# Patient Record
Sex: FEMALE | Race: WHITE | NOT HISPANIC OR LATINO
[De-identification: names, ages, dates, MRNs, and addresses within clinical notes are randomized per-mention and may not be internally consistent; named-entity substitution may affect disease eponyms.]

---
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
## 2024-02-16 RX ORDER — CIPROFLOXACIN LACTATE 400MG/40ML
1 VIAL (ML) INTRAVENOUS
Refills: 0 | DISCHARGE
Start: 2024-02-16

## 2024-02-22 ENCOUNTER — INPATIENT (INPATIENT)
Facility: HOSPITAL | Age: 19
LOS: 1 days | Discharge: HOME | DRG: 690 | End: 2024-02-24
Attending: FAMILY MEDICINE | Admitting: INTERNAL MEDICINE
Payer: COMMERCIAL

## 2024-02-22 VITALS — HEIGHT: 65 IN | WEIGHT: 138.01 LBS

## 2024-02-22 LAB
APPEARANCE UR: ABNORMAL
BILIRUB UR-MCNC: NEGATIVE — SIGNIFICANT CHANGE UP
COLOR SPEC: YELLOW — SIGNIFICANT CHANGE UP
DIFF PNL FLD: NEGATIVE — SIGNIFICANT CHANGE UP
GLUCOSE UR QL: NEGATIVE MG/DL — SIGNIFICANT CHANGE UP
HCT VFR BLD CALC: 40.6 % — SIGNIFICANT CHANGE UP (ref 34.5–45)
HGB BLD-MCNC: 13.7 G/DL — SIGNIFICANT CHANGE UP (ref 11.5–15.5)
KETONES UR-MCNC: NEGATIVE MG/DL — SIGNIFICANT CHANGE UP
LEUKOCYTE ESTERASE UR-ACNC: ABNORMAL
MCHC RBC-ENTMCNC: 28.7 PG — SIGNIFICANT CHANGE UP (ref 27–34)
MCHC RBC-ENTMCNC: 33.7 GM/DL — SIGNIFICANT CHANGE UP (ref 32–36)
MCV RBC AUTO: 85.1 FL — SIGNIFICANT CHANGE UP (ref 80–100)
NITRITE UR-MCNC: NEGATIVE — SIGNIFICANT CHANGE UP
PH UR: 5 — SIGNIFICANT CHANGE UP (ref 5–8)
PLATELET # BLD AUTO: 321 K/UL — SIGNIFICANT CHANGE UP (ref 150–400)
PROT UR-MCNC: NEGATIVE MG/DL — SIGNIFICANT CHANGE UP
RBC # BLD: 4.77 M/UL — SIGNIFICANT CHANGE UP (ref 3.8–5.2)
RBC # FLD: 13.1 % — SIGNIFICANT CHANGE UP (ref 10.3–14.5)
SP GR SPEC: 1.02 — SIGNIFICANT CHANGE UP (ref 1–1.03)
UROBILINOGEN FLD QL: 1 MG/DL — SIGNIFICANT CHANGE UP (ref 0.2–1)
WBC # BLD: 3.56 K/UL — LOW (ref 3.8–10.5)
WBC # FLD AUTO: 3.56 K/UL — LOW (ref 3.8–10.5)

## 2024-02-22 PROCEDURE — 99285 EMERGENCY DEPT VISIT HI MDM: CPT

## 2024-02-22 PROCEDURE — 93010 ELECTROCARDIOGRAM REPORT: CPT

## 2024-02-22 RX ORDER — SODIUM CHLORIDE 9 MG/ML
1950 INJECTION INTRAMUSCULAR; INTRAVENOUS; SUBCUTANEOUS ONCE
Refills: 0 | Status: COMPLETED | OUTPATIENT
Start: 2024-02-22 | End: 2024-02-22

## 2024-02-22 NOTE — ED ADULT TRIAGE NOTE - CHIEF COMPLAINT QUOTE
Pt presents to ED complaining of R sided flank pain. Was seen in ED x2 days ago and was diagnosed with Flu A and complicated UTI. Currently on Cipro. Wants to get urine retested, unable to tolerate ABX. Denies urinary symptoms.  Follows up with urologist.

## 2024-02-23 DIAGNOSIS — N12 TUBULO-INTERSTITIAL NEPHRITIS, NOT SPECIFIED AS ACUTE OR CHRONIC: ICD-10-CM

## 2024-02-23 LAB
ALBUMIN SERPL ELPH-MCNC: 4 G/DL — SIGNIFICANT CHANGE UP (ref 3.3–5)
ALP SERPL-CCNC: 55 U/L — SIGNIFICANT CHANGE UP (ref 40–120)
ALT FLD-CCNC: 21 U/L — SIGNIFICANT CHANGE UP (ref 12–78)
ANION GAP SERPL CALC-SCNC: 1 MMOL/L — LOW (ref 5–17)
APTT BLD: 32.3 SEC — SIGNIFICANT CHANGE UP (ref 24.5–35.6)
AST SERPL-CCNC: 20 U/L — SIGNIFICANT CHANGE UP (ref 15–37)
BACTERIA # UR AUTO: ABNORMAL /HPF
BASOPHILS # BLD AUTO: 0 K/UL — SIGNIFICANT CHANGE UP (ref 0–0.2)
BASOPHILS NFR BLD AUTO: 0 % — SIGNIFICANT CHANGE UP (ref 0–2)
BILIRUB SERPL-MCNC: 0.2 MG/DL — SIGNIFICANT CHANGE UP (ref 0.2–1.2)
BUN SERPL-MCNC: 8 MG/DL — SIGNIFICANT CHANGE UP (ref 7–23)
CALCIUM SERPL-MCNC: 9.1 MG/DL — SIGNIFICANT CHANGE UP (ref 8.5–10.1)
CAST: SIGNIFICANT CHANGE UP /LPF
CHLORIDE SERPL-SCNC: 109 MMOL/L — HIGH (ref 96–108)
CO2 SERPL-SCNC: 30 MMOL/L — SIGNIFICANT CHANGE UP (ref 22–31)
CREAT SERPL-MCNC: 0.88 MG/DL — SIGNIFICANT CHANGE UP (ref 0.5–1.3)
EGFR: 97 ML/MIN/1.73M2 — SIGNIFICANT CHANGE UP
EOSINOPHIL # BLD AUTO: 0.18 K/UL — SIGNIFICANT CHANGE UP (ref 0–0.5)
EOSINOPHIL NFR BLD AUTO: 5 % — SIGNIFICANT CHANGE UP (ref 0–6)
FLUAV AG NPH QL: DETECTED
FLUBV AG NPH QL: SIGNIFICANT CHANGE UP
GLUCOSE SERPL-MCNC: 97 MG/DL — SIGNIFICANT CHANGE UP (ref 70–99)
INR BLD: 0.95 RATIO — SIGNIFICANT CHANGE UP (ref 0.85–1.18)
LACTATE SERPL-SCNC: 0.7 MMOL/L — SIGNIFICANT CHANGE UP (ref 0.7–2)
LYMPHOCYTES # BLD AUTO: 1.35 K/UL — SIGNIFICANT CHANGE UP (ref 1–3.3)
LYMPHOCYTES # BLD AUTO: 38 % — SIGNIFICANT CHANGE UP (ref 13–44)
MANUAL SMEAR VERIFICATION: SIGNIFICANT CHANGE UP
MONOCYTES # BLD AUTO: 0.32 K/UL — SIGNIFICANT CHANGE UP (ref 0–0.9)
MONOCYTES NFR BLD AUTO: 9 % — SIGNIFICANT CHANGE UP (ref 2–14)
NEUTROPHILS # BLD AUTO: 1.64 K/UL — LOW (ref 1.8–7.4)
NEUTROPHILS NFR BLD AUTO: 45 % — SIGNIFICANT CHANGE UP (ref 43–77)
NEUTS BAND # BLD: 1 % — SIGNIFICANT CHANGE UP (ref 0–8)
NRBC # BLD: 0 /100 WBCS — SIGNIFICANT CHANGE UP (ref 0–0)
NRBC # BLD: SIGNIFICANT CHANGE UP /100 WBCS (ref 0–0)
PLAT MORPH BLD: NORMAL — SIGNIFICANT CHANGE UP
POCT URINE PREGNANCY TEST: NEGATIVE — SIGNIFICANT CHANGE UP
POTASSIUM SERPL-MCNC: 3.9 MMOL/L — SIGNIFICANT CHANGE UP (ref 3.5–5.3)
POTASSIUM SERPL-SCNC: 3.9 MMOL/L — SIGNIFICANT CHANGE UP (ref 3.5–5.3)
PROT SERPL-MCNC: 8.4 GM/DL — HIGH (ref 6–8.3)
PROTHROM AB SERPL-ACNC: 10.7 SEC — SIGNIFICANT CHANGE UP (ref 9.5–13)
RBC BLD AUTO: NORMAL — SIGNIFICANT CHANGE UP
RBC CASTS # UR COMP ASSIST: 1 /HPF — SIGNIFICANT CHANGE UP (ref 0–4)
RSV RNA NPH QL NAA+NON-PROBE: SIGNIFICANT CHANGE UP
SARS-COV-2 RNA SPEC QL NAA+PROBE: SIGNIFICANT CHANGE UP
SODIUM SERPL-SCNC: 140 MMOL/L — SIGNIFICANT CHANGE UP (ref 135–145)
SQUAMOUS # UR AUTO: 10 /HPF — HIGH (ref 0–5)
VARIANT LYMPHS # BLD: 2 % — SIGNIFICANT CHANGE UP (ref 0–6)
WBC UR QL: 9 /HPF — HIGH (ref 0–5)

## 2024-02-23 PROCEDURE — 99254 IP/OBS CNSLTJ NEW/EST MOD 60: CPT

## 2024-02-23 PROCEDURE — 74177 CT ABD & PELVIS W/CONTRAST: CPT | Mod: 26,MC

## 2024-02-23 PROCEDURE — G0378: CPT

## 2024-02-23 PROCEDURE — 99222 1ST HOSP IP/OBS MODERATE 55: CPT

## 2024-02-23 PROCEDURE — 0241U: CPT

## 2024-02-23 RX ORDER — DEXAMETHASONE 0.5 MG/5ML
6 ELIXIR ORAL ONCE
Refills: 0 | Status: COMPLETED | OUTPATIENT
Start: 2024-02-23 | End: 2024-02-23

## 2024-02-23 RX ORDER — CEFTRIAXONE 500 MG/1
1000 INJECTION, POWDER, FOR SOLUTION INTRAMUSCULAR; INTRAVENOUS EVERY 24 HOURS
Refills: 0 | Status: DISCONTINUED | OUTPATIENT
Start: 2024-02-23 | End: 2024-02-24

## 2024-02-23 RX ORDER — PROCHLORPERAZINE MALEATE 5 MG
1 TABLET ORAL
Refills: 0 | DISCHARGE

## 2024-02-23 RX ORDER — DIPHENHYDRAMINE HCL 50 MG
25 CAPSULE ORAL ONCE
Refills: 0 | Status: COMPLETED | OUTPATIENT
Start: 2024-02-23 | End: 2024-02-23

## 2024-02-23 RX ORDER — DIPHENHYDRAMINE HCL 50 MG
50 CAPSULE ORAL ONCE
Refills: 0 | Status: COMPLETED | OUTPATIENT
Start: 2024-02-23 | End: 2024-02-23

## 2024-02-23 RX ORDER — NORETHINDRONE AND ETHINYL ESTRADIOL 0.4-0.035
1 KIT ORAL
Refills: 0 | DISCHARGE

## 2024-02-23 RX ORDER — ACETAMINOPHEN 500 MG
650 TABLET ORAL EVERY 6 HOURS
Refills: 0 | Status: DISCONTINUED | OUTPATIENT
Start: 2024-02-23 | End: 2024-02-24

## 2024-02-23 RX ORDER — CEFTRIAXONE 500 MG/1
1000 INJECTION, POWDER, FOR SOLUTION INTRAMUSCULAR; INTRAVENOUS EVERY 24 HOURS
Refills: 0 | Status: DISCONTINUED | OUTPATIENT
Start: 2024-02-23 | End: 2024-02-23

## 2024-02-23 RX ORDER — ONDANSETRON 8 MG/1
4 TABLET, FILM COATED ORAL EVERY 6 HOURS
Refills: 0 | Status: DISCONTINUED | OUTPATIENT
Start: 2024-02-23 | End: 2024-02-24

## 2024-02-23 RX ORDER — MEROPENEM 1 G/30ML
1000 INJECTION INTRAVENOUS ONCE
Refills: 0 | Status: DISCONTINUED | OUTPATIENT
Start: 2024-02-23 | End: 2024-02-23

## 2024-02-23 RX ORDER — LANOLIN ALCOHOL/MO/W.PET/CERES
3 CREAM (GRAM) TOPICAL AT BEDTIME
Refills: 0 | Status: DISCONTINUED | OUTPATIENT
Start: 2024-02-23 | End: 2024-02-24

## 2024-02-23 RX ORDER — MEROPENEM 1 G/30ML
1000 INJECTION INTRAVENOUS ONCE
Refills: 0 | Status: COMPLETED | OUTPATIENT
Start: 2024-02-23 | End: 2024-02-23

## 2024-02-23 RX ADMIN — Medication 50 MILLIGRAM(S): at 22:02

## 2024-02-23 RX ADMIN — MEROPENEM 1000 MILLIGRAM(S): 1 INJECTION INTRAVENOUS at 02:47

## 2024-02-23 RX ADMIN — SODIUM CHLORIDE 1950 MILLILITER(S): 9 INJECTION INTRAMUSCULAR; INTRAVENOUS; SUBCUTANEOUS at 00:03

## 2024-02-23 RX ADMIN — Medication 6 MILLIGRAM(S): at 03:29

## 2024-02-23 RX ADMIN — CEFTRIAXONE 1000 MILLIGRAM(S): 500 INJECTION, POWDER, FOR SOLUTION INTRAMUSCULAR; INTRAVENOUS at 11:46

## 2024-02-23 RX ADMIN — Medication 650 MILLIGRAM(S): at 12:48

## 2024-02-23 RX ADMIN — Medication 650 MILLIGRAM(S): at 12:42

## 2024-02-23 RX ADMIN — Medication 25 MILLIGRAM(S): at 03:26

## 2024-02-23 NOTE — ED STATDOCS - EYES, MLM
clear bilaterally.  Pupils equal, round, and reactive to light. clear bilaterally.  Pupils equal, round, and reactive to accomodation. Visual fields intact x 4 quadrants.

## 2024-02-23 NOTE — ED ADULT NURSE REASSESSMENT NOTE - NS ED NURSE REASSESS COMMENT FT1
Pt received A+Ox4. VSS. Afebrile. Pt c/o mild lower back/ flank pain. Tylenol given as ordered. Appetite good. Urine malodorous per pt. Rocephin given as ordered. Pt and family instructed in plan of care. Monitored closely. Call bell in reach.

## 2024-02-23 NOTE — ED ADULT NURSE NOTE - OBJECTIVE STATEMENT
18y female, A&Ox3 ambulatory from home presents to ED c/o right side flank pain. Pt was seen in the ED recently and was dc with the flu and UTI, currently taking cipro. Pt reports being unable to tolerate antibiotics, and would like to get urine retested r/t flank pain. Pt denies any urinary frequency or burning upon urination. Pt does not offer any other complaints at this time. Respirations are even and unlabored, in NAD.

## 2024-02-23 NOTE — PHARMACOTHERAPY INTERVENTION NOTE - COMMENTS
Medication reconciliation completed.  Reviewed Medication list and confirmed med allergies with patient; confirmed with Dr. [facility].  Pt takes daily Junel FE contraceptive, mom can provide supply from home if pt is to remain on this while in .

## 2024-02-23 NOTE — H&P ADULT - ASSESSMENT
A/P:    1.  Pyelonephritis  h/o ESBL UTI  -was started on IV meropenem, now stopped due to allergic reaction  -will follow repeat culture  -follow ID consult for Antibiotics optimization  -follow Urology consult    2.  SCD for DVT ppx while on bed    3.  Code status  -full code

## 2024-02-23 NOTE — ED STATDOCS - CPE ED ENMT NORM
Pt very anxious this evening  Pacing floor from room to entry door  Hanging at entry door  Took shoes on and off at various places and carrying clothes to and fro  Disheveled  Urine odor  Snapped at peer nurse and caused a disturbance  Older brother came to visit, pt refused to him  Told writer, "tell brother to get me a "  Went to offer pt PRN Haldol and Cogentin  Pt choked on apple, could not catch his breath, gave Heimlich and food cleared  Began breathing normally  Took Haldol and cogentin  Medication effective for use  Pt calmer and apologetic later  normal...

## 2024-02-23 NOTE — PROGRESS NOTE ADULT - SUBJECTIVE AND OBJECTIVE BOX
GAETANOHARVINDER DAVISN  18y  Female      Patient is a 18y old  Female who presents with a chief complaint of ESBL UTI (23 Feb 2024 11:37)      INTERVAL HPI/OVERNIGHT EVENTS:  Seen and examined, with her dad at bedside.  Appears comfortable, no acute distress  Complains of left flank pain      REVIEW OF SYSTEMS:  CONSTITUTIONAL: No fever, weight loss, or fatigue  EYES: No eye pain, visual disturbances, or discharge  ENMT:  No difficulty hearing, tinnitus, vertigo; No sinus or throat pain  NECK: No pain or stiffness  BREASTS: No pain, masses, or nipple discharge  RESPIRATORY: No cough, wheezing, chills or hemoptysis; No shortness of breath  CARDIOVASCULAR: No chest pain, palpitations, dizziness, or leg swelling  GASTROINTESTINAL: No abdominal or epigastric pain. No nausea, vomiting, or hematemesis; No diarrhea or constipation. No melena or hematochezia.  GENITOURINARY: No dysuria, frequency, hematuria, or incontinence  NEUROLOGICAL: No headaches, memory loss, loss of strength, numbness, or tremors  SKIN: No itching, burning, rashes, or lesions   LYMPH NODES: No enlarged glands  ENDOCRINE: No heat or cold intolerance; No hair loss  MUSCULOSKELETAL: +Left flank pain  PSYCHIATRIC: No depression, anxiety, mood swings, or difficulty sleeping  HEME/LYMPH: No easy bruising, or bleeding gums  ALLERY AND IMMUNOLOGIC: No hives or eczema    T(C): 36.9 (02-23-24 @ 11:47), Max: 36.9 (02-22-24 @ 21:38)  HR: 75 (02-23-24 @ 11:47) (72 - 81)  BP: 125/80 (02-23-24 @ 11:47) (117/83 - 146/86)  RR: 16 (02-23-24 @ 11:47) (16 - 20)  SpO2: 98% (02-23-24 @ 11:47) (97% - 100%)  Wt(kg): --Vital Signs Last 24 Hrs  T(C): 36.9 (23 Feb 2024 11:47), Max: 36.9 (22 Feb 2024 21:38)  T(F): 98.4 (23 Feb 2024 11:47), Max: 98.5 (22 Feb 2024 21:38)  HR: 75 (23 Feb 2024 11:47) (72 - 81)  BP: 125/80 (23 Feb 2024 11:47) (117/83 - 146/86)  BP(mean): 93 (23 Feb 2024 03:05) (83 - 97)  RR: 16 (23 Feb 2024 11:47) (16 - 20)  SpO2: 98% (23 Feb 2024 11:47) (97% - 100%)    Parameters below as of 23 Feb 2024 11:47  Patient On (Oxygen Delivery Method): room air        PHYSICAL EXAM:  GENERAL: NAD, well-groomed, well-developed  HEAD:  Atraumatic, Normocephalic  EYES: EOMI, PERRLA, conjunctiva and sclera clear  ENMT: No tonsillar erythema, exudates, or enlargement; Moist mucous membranes, Good dentition, No lesions  NECK: Supple, No JVD, Normal thyroid  NERVOUS SYSTEM:  Alert & Oriented X3, Good concentration; Motor Strength 5/5 B/L upper and lower extremities; DTRs 2+ intact and symmetric  CHEST/LUNG: Clear to percussion bilaterally; No rales, rhonchi, wheezing, or rubs  HEART: Regular rate and rhythm; No murmurs, rubs, or gallops  ABDOMEN: Soft, Nontender, Nondistended; Bowel sounds present, +Mild CVA tenderness left sided  EXTREMITIES:  2+ Peripheral Pulses, No clubbing, cyanosis, or edema  LYMPH: No lymphadenopathy noted  SKIN: No rashes or lesions    Consultant(s) Notes Reviewed:  [x ] YES  [ ] NO  Care Discussed with Consultants/Other Providers [ x] YES  [ ] NO    LABS:                        13.7   3.56  )-----------( 321      ( 22 Feb 2024 23:30 )             40.6     02-22    140  |  109<H>  |  8   ----------------------------<  97  3.9   |  30  |  0.88    Ca    9.1      22 Feb 2024 23:30    TPro  8.4<H>  /  Alb  4.0  /  TBili  0.2  /  DBili  x   /  AST  20  /  ALT  21  /  AlkPhos  55  02-22    PT/INR - ( 22 Feb 2024 23:30 )   PT: 10.7 sec;   INR: 0.95 ratio         PTT - ( 22 Feb 2024 23:30 )  PTT:32.3 sec  Urinalysis Basic - ( 22 Feb 2024 23:30 )    Color: x / Appearance: x / SG: x / pH: x  Gluc: 97 mg/dL / Ketone: x  / Bili: x / Urobili: x   Blood: x / Protein: x / Nitrite: x   Leuk Esterase: x / RBC: x / WBC x   Sq Epi: x / Non Sq Epi: x / Bacteria: x      CAPILLARY BLOOD GLUCOSE            Urinalysis Basic - ( 22 Feb 2024 23:30 )    Color: x / Appearance: x / SG: x / pH: x  Gluc: 97 mg/dL / Ketone: x  / Bili: x / Urobili: x   Blood: x / Protein: x / Nitrite: x   Leuk Esterase: x / RBC: x / WBC x   Sq Epi: x / Non Sq Epi: x / Bacteria: x        RADIOLOGY & ADDITIONAL TESTS:    Imaging Personally Reviewed:  [ ] YES  [ ] NO    HEALTH ISSUES - PROBLEM Dx:

## 2024-02-23 NOTE — ED STATDOCS - DIFFERENTIAL DIAGNOSIS
hx of ESBL UTI, ct evidence of pyelonephritis, updated pt and mom with results of labs and imaging. Admission and further inpatient care and follow up with ID. Differential Diagnosis

## 2024-02-23 NOTE — CONSULT NOTE ADULT - ASSESSMENT
18 year old Female with PMH of chronic UTIs, presented with complain of  flank pain for the last few days. Was treated outpatient and is currently on antibiotics from her previous ER visit. No chest pain, sob or palpitation.  Patient lives in [place] for work, though she is from [place]. Her UTI episodes started 4 months ago. Since she is having repeated UTI every 2-3 weeks despite multiple antibiotics treatment. Was seen by the Urologist once so far, no procedure was done. Her last Urine culture from [facility] 2/16 growing Ecoli, ESBL negative, was given ciprofloxacin. Also flu positive on tuesday. Has flank pain no dysuria.     1. Viral sydnrome. Influenza. Pyelonephritis/UTI with Ecoli  - imaging reviewed  - f/u urine cx blood cx  - 2/16 urine cx grew Ecoli not ESBL S cipro/rocephin  - continue with abx coverage  - on dc po ciprofloxacin 500mg BID x 2 week course  - had rash with merrem / now resolved, trial rocephin can given benadryl prior, no pcn allergy  - monitor temps  - tolerating abx well so far; no side effects noted  - reason for abx use and side effects reviewed with patient  - supportive care  - fu cbc    Clinical team may change from intravenous to oral antibiotics when the following criteria are met:   1. Patient is clinically improving/stable       a)	Improved signs and symptoms of infection from initial presentation       b)	Afebrile for 24 hours       c)	Leukocytosis trending towards normal range   2. Patient is tolerating oral intake   3. Initial blood cultures are negative     Cannot advise changing to oral antibiotic therapy until culture sensitivity is available but it pt discharged can do po cipro 500mg BID x 14 day course   18 year old Female with PMH of chronic UTIs, presented with complain of  flank pain for the last few days. Was treated outpatient and is currently on antibiotics from her previous ER visit. No chest pain, sob or palpitation.  Patient lives in [place] for work, though she is from [place]. Her UTI episodes started 4 months ago. Since she is having repeated UTI every 2-3 weeks despite multiple antibiotics treatment. Was seen by the Urologist once so far, no procedure was done. Her last Urine culture from [facility] 2/16 growing Ecoli, ESBL negative, was given ciprofloxacin. Also flu positive on tuesday. Has flank pain no dysuria.     1. Viral sydnrome. Influenza. Pyelonephritis/UTI with Ecoli  - imaging reviewed  - f/u urine cx blood cx  - 2/16 urine cx grew Ecoli not ESBL S cipro/rocephin  - continue with abx coverage  - on dc po ciprofloxacin 500mg BID x 2 week course  - had rash with merrem / now resolved, trial rocephin can given benadryl prior, no pcn allergy  - monitor temps  - tolerating abx well so far; no side effects noted  - reason for abx use and side effects reviewed with patient  - out of window for tamiflu, droplet precautions  - supportive care  - fu cbc    Clinical team may change from intravenous to oral antibiotics when the following criteria are met:   1. Patient is clinically improving/stable       a)	Improved signs and symptoms of infection from initial presentation       b)	Afebrile for 24 hours       c)	Leukocytosis trending towards normal range   2. Patient is tolerating oral intake   3. Initial blood cultures are negative     Cannot advise changing to oral antibiotic therapy until culture sensitivity is available but it pt discharged can do po cipro 500mg BID x 14 day course

## 2024-02-23 NOTE — ED STATDOCS - CONSTITUTIONAL, MLM
normal... well appearing and in no apparent distress. well appearing and in no apparent distress. Nontoxic appearing. Good color and tone. AAOx4.

## 2024-02-23 NOTE — CONSULT NOTE ADULT - ASSESSMENT
19 yo female with PMH as above admitted for pyelonephritis and recurrent UTIs.  CT with "Heterogeneous enhancement of both kidneys with multiple cortical   enhancement defects and minimal perinephric stranding which raises   concern for pyelonephritis. Very mild circumferential thickening of the   urinary bladder walls which could reflect a cystitis."  Recommend  - Continue antibiotics, adjust as per cultures/ sensitivities    - Recommended: good oral hydration, timed voiding, urinate right after sex, change sanitary pads often, avoid douches, bubble baths and other feminine hygiene products.   - Discussed that if she keeps having UTIs options would be to use low prophylactic antibiotics/ post coital prophylaxis- this can be done outpatient   - Follow up with Dr. Casey or her urologist outpatient upon discharge for further management    Case discussed with Dr. Casey

## 2024-02-23 NOTE — CONSULT NOTE ADULT - SUBJECTIVE AND OBJECTIVE BOX
Patient is a 18y old  Female who presents with a chief complaint of ESBL UTI (23 Feb 2024 04:36)    HPI:  18 year old Female with PMH of chronic UTIs, presented with complain of  flank pain for the last few days. Was treated outpatient and is currently on antibiotics from her previous ER visit. No chest pain, sob or palpitation.  Patient lives in [place] for work, though she is from [place]. Her UTI episodes started 4 months ago. Since she is having repeated UTI every 2-3 weeks despite multiple antibiotics treatment. Was seen by the Urologist once so far, no procedure was done. Her last Urine culture from [facility] 2/16 growing Ecoli, ESBL negative, was given ciprofloxacin. Also flu positive on tuesday. Has flank pain no dysuria.     PMH: as above  PSH: as above  Meds: per reconciliation sheet, noted below  MEDICATIONS  (STANDING):  cefTRIAXone   IVPB 1000 milliGRAM(s) IV Intermittent every 24 hours    Allergies    meropenem (Rash; Urticaria; Flushing)    Intolerances      Social: no smoking, no alcohol, no illegal drugs; no recent travel, no exposure to TB  FAMILY HISTORY:  No pertinent family history in first degree relatives       no history of premature cardiovascular disease in first degree relatives    ROS: the patient denies fever, no chills, no HA, no dizziness, no sore throat, no blurry vision, no CP, no palpitations, no SOB, + cough, no abdominal pain, no diarrhea, no N/V, no dysuria, no leg pain, no claudication, no joint aches, no rectal pain or bleeding, no night sweats + flank pain    All other systems reviewed and are negative    Vital Signs Last 24 Hrs  T(C): 36.7 (23 Feb 2024 08:26), Max: 36.9 (22 Feb 2024 21:38)  T(F): 98.1 (23 Feb 2024 08:26), Max: 98.5 (22 Feb 2024 21:38)  HR: 73 (23 Feb 2024 08:26) (72 - 81)  BP: 117/83 (23 Feb 2024 08:26) (117/83 - 146/86)  BP(mean): 93 (23 Feb 2024 03:05) (83 - 97)  RR: 18 (23 Feb 2024 08:26) (18 - 20)  SpO2: 97% (23 Feb 2024 08:26) (97% - 100%)    Parameters below as of 23 Feb 2024 08:26  Patient On (Oxygen Delivery Method): room air      Daily Height in cm: 165.1 (22 Feb 2024 21:37)    Daily     PE:  Constitutional: NAD  HEENT: NC/AT, EOMI, PERRLA, conjunctivae clear; ears and nose atraumatic; pharynx benign  Neck: supple; thyroid not palpable  Back: no tenderness  Respiratory: respiratory effort normal; clear to auscultation  Cardiovascular: S1S2 regular, no murmurs  Abdomen: soft, not tender, not distended, positive BS; liver and spleen WNL  Genitourinary: no suprapubic tenderness  Lymphatic: no LN palpable  Musculoskeletal: no muscle tenderness, no joint swelling or tenderness  Extremities: no pedal edema  Neurological/ Psychiatric: AxOx3, Judgement and insight normal;  moving all extremities  Skin:  no palpable lesions    Labs: all available labs reviewed                        13.7   3.56  )-----------( 321      ( 22 Feb 2024 23:30 )             40.6     02-22    140  |  109<H>  |  8   ----------------------------<  97  3.9   |  30  |  0.88    Ca    9.1      22 Feb 2024 23:30    TPro  8.4<H>  /  Alb  4.0  /  TBili  0.2  /  DBili  x   /  AST  20  /  ALT  21  /  AlkPhos  55  02-22     LIVER FUNCTIONS - ( 22 Feb 2024 23:30 )  Alb: 4.0 g/dL / Pro: 8.4 gm/dL / ALK PHOS: 55 U/L / ALT: 21 U/L / AST: 20 U/L / GGT: x           Urinalysis Basic - ( 22 Feb 2024 23:30 )    Color: x / Appearance: x / SG: x / pH: x  Gluc: 97 mg/dL / Ketone: x  / Bili: x / Urobili: x   Blood: x / Protein: x / Nitrite: x   Leuk Esterase: x / RBC: x / WBC x   Sq Epi: x / Non Sq Epi: x / Bacteria: x          Radiology: all available radiological tests reviewed  < from: CT Abdomen and Pelvis w/ IV Cont (02.23.24 @ 02:15) >    ACC: [id number] EXAM:  CT ABDOMEN AND PELVIS IC   ORDERED BY: FELIX GRECO     PROCEDURE DATE:  02/23/2024          INTERPRETATION:  CLINICAL INFORMATION: Abdominal pain.    COMPARISON: None.    CONTRAST/COMPLICATIONS:  IV Contrast: 90 cc of Omnipaque 350.  10 cc discarded.  Oral Contrast: None.  Complications: No adverse reactions at the time of this exam.    PROCEDURE:  CT of the Abdomen and Pelvis was performed.  Sagittal and coronal reformats were performed.    FINDINGS:  LOWER CHEST: Within normal limits.    LIVER: Within normal limits.  BILE DUCTS: Normal caliber.  GALLBLADDER: Underdistended.  SPLEEN: Within normal limits.  PANCREAS: Within normal limits.  ADRENALS: Within normal limits.  KIDNEYS/URETERS: Heterogeneous enhancement of both kidneys with multiple   cortical enhancement defects and minimal perinephric stranding. No right   or left hydronephrosis.    BLADDER: Very mild circumferential thickening of the urinary bladder   walls.  REPRODUCTIVE ORGANS: Uterus and adnexaare unremarkable.    BOWEL: No bowel obstruction or overt bowel wall thickening. Appendix is   not visualized. No evidence of inflammation in the pericecal region.  PERITONEUM: No ascites, pneumoperitoneum, or loculated collection. No   mesenteric lymphadenopathy.  VESSELS: Within normal limits.  RETROPERITONEUM/LYMPH NODES: No lymphadenopathy.  ABDOMINAL WALL: Metallic umbilical ring.  BONES: Within normal limits.    IMPRESSION:  Heterogeneous enhancement of both kidneys with multiple cortical   enhancement defects and minimal perinephric stranding which raises   concern for pyelonephritis. Very mild circumferential thickening of the   urinary bladder walls which could reflect a cystitis.      < end of copied text >    Advanced directives addressed: full resuscitation
HPI:  "18 year old Female with PMH of chronic UTIs, Hx of ESBL UTI, presented with complain of of dysuria, flank pain for the last few days. Was treated outpatient and is currently on antibiotics from her previous ER visit. No chest pain, sob or palpitation.   Patient lives in [place] for work, though she is from [place]. Her UTI episodes started 4 months ago. Since she is having repeated UTI every 2-3 weeks despite multiple antibiotics treatment. Was seen by the Urologist once so far, no procedure was done. Her last Urine culture from [facility] came back positive with ESBL.  (23 Feb 2024 04:36)"    19 yo female with PMH as above admitted for pyelonephritis. Patient seen at bedside reports she had been getting recurrent UTIs over the past 4-6 months. Reports she was getting a UTI every month and recently saw a urologist in [place] who recommended she has an imaging study of her kidneys and bladder. Patient reports she had an episode of ESBL UTI in the past but most recently her urine culture was positive for E coli which was pan sensitive and she was placed on Cipro by her urologist which she is currently taking. She reports 2 days ago she developed fevers, and b/l flank pain prompting her to come to the ED. Reports she also has the flu and is unsure if fevers are due to that. She notes dull achy b/l flank pain but denies any dysuria, hematuria, abd pain. Denies hx of kidney stones (reports family hx of kidney stones in father). Reports she is sexually active with one partner with no concern for STI/STDs but does not her UTIs occur around sexual activity.       PAST MEDICAL & SURGICAL HISTORY:  No pertinent past medical history      No significant past surgical history          REVIEW OF SYSTEMS     All other review of systems neg, except as noted in HPI    MEDICATIONS  (STANDING):  cefTRIAXone Injectable. 1000 milliGRAM(s) IV Push every 24 hours    MEDICATIONS  (PRN):  acetaminophen     Tablet .. 650 milliGRAM(s) Oral every 6 hours PRN Mild Pain (1 - 3)  aluminum hydroxide/magnesium hydroxide/simethicone Suspension 30 milliLiter(s) Oral every 4 hours PRN Dyspepsia  melatonin 3 milliGRAM(s) Oral at bedtime PRN Insomnia  ondansetron Injectable 4 milliGRAM(s) IV Push every 6 hours PRN Nausea and/or Vomiting      Allergies    meropenem (Rash; Urticaria; Flushing)    Intolerances        SOCIAL HISTORY:    FAMILY HISTORY:  No pertinent family history in first degree relatives        Vital Signs Last 24 Hrs  T(C): 36.7 (23 Feb 2024 08:26), Max: 36.9 (22 Feb 2024 21:38)  T(F): 98.1 (23 Feb 2024 08:26), Max: 98.5 (22 Feb 2024 21:38)  HR: 73 (23 Feb 2024 08:26) (72 - 81)  BP: 117/83 (23 Feb 2024 08:26) (117/83 - 146/86)  BP(mean): 93 (23 Feb 2024 03:05) (83 - 97)  RR: 18 (23 Feb 2024 08:26) (18 - 20)  SpO2: 97% (23 Feb 2024 08:26) (97% - 100%)    Parameters below as of 23 Feb 2024 08:26  Patient On (Oxygen Delivery Method): room air        PHYSICAL EXAM:    General: No distress, No anxiety  VITALS  T(C): 36.7 (02-23-24 @ 08:26), Max: 36.9 (02-22-24 @ 21:38)  HR: 73 (02-23-24 @ 08:26) (72 - 81)  BP: 117/83 (02-23-24 @ 08:26) (117/83 - 146/86)  RR: 18 (02-23-24 @ 08:26) (18 - 20)  SpO2: 97% (02-23-24 @ 08:26) (97% - 100%)            Skin     : No jaundice   HEENT: Normocephalic, no icterus , EOM full , No epistaxis  Lung    : No resp distress  Abdo:   : Soft, Non tender, No guarding, No distension   Back    : + CVAT b/l  Genitalia Female: No Zapata  Neuro   : A&Ox3    LABS:                        13.7   3.56  )-----------( 321      ( 22 Feb 2024 23:30 )             40.6     02-22    140  |  109<H>  |  8   ----------------------------<  97  3.9   |  30  |  0.88    Ca    9.1      22 Feb 2024 23:30    TPro  8.4<H>  /  Alb  4.0  /  TBili  0.2  /  DBili  x   /  AST  20  /  ALT  21  /  AlkPhos  55  02-22    PT/INR - ( 22 Feb 2024 23:30 )   PT: 10.7 sec;   INR: 0.95 ratio         PTT - ( 22 Feb 2024 23:30 )  PTT:32.3 sec  Urinalysis Basic - ( 22 Feb 2024 23:30 )    Color: x / Appearance: x / SG: x / pH: x  Gluc: 97 mg/dL / Ketone: x  / Bili: x / Urobili: x   Blood: x / Protein: x / Nitrite: x   Leuk Esterase: x / RBC: x / WBC x   Sq Epi: x / Non Sq Epi: x / Bacteria: x        RADIOLOGY & ADDITIONAL STUDIES:< from: CT Abdomen and Pelvis w/ IV Cont (02.23.24 @ 02:15) >  ACC: [id number] EXAM:  CT ABDOMEN AND PELVIS IC   ORDERED BY: FELIX GRECO     PROCEDURE DATE:  02/23/2024          INTERPRETATION:  CLINICAL INFORMATION: Abdominal pain.    COMPARISON: None.    CONTRAST/COMPLICATIONS:  IV Contrast: 90 cc of Omnipaque 350.  10 cc discarded.  Oral Contrast: None.  Complications: No adverse reactions at the time of this exam.    PROCEDURE:  CT of the Abdomen and Pelvis was performed.  Sagittal and coronal reformats were performed.    FINDINGS:  LOWER CHEST: Within normal limits.    LIVER: Within normal limits.  BILE DUCTS: Normal caliber.  GALLBLADDER: Underdistended.  SPLEEN: Within normal limits.  PANCREAS: Within normal limits.  ADRENALS: Within normal limits.  KIDNEYS/URETERS: Heterogeneous enhancement of both kidneys with multiple   cortical enhancement defects and minimal perinephric stranding. No right   or left hydronephrosis.    BLADDER: Very mild circumferential thickening of the urinary bladder   walls.  REPRODUCTIVE ORGANS: Uterus and adnexaare unremarkable.    BOWEL: No bowel obstruction or overt bowel wall thickening. Appendix is   not visualized. No evidence of inflammation in the pericecal region.  PERITONEUM: No ascites, pneumoperitoneum, or loculated collection. No   mesenteric lymphadenopathy.  VESSELS: Within normal limits.  RETROPERITONEUM/LYMPH NODES: No lymphadenopathy.  ABDOMINAL WALL: Metallic umbilical ring.  BONES: Within normal limits.    IMPRESSION:  Heterogeneous enhancement of both kidneys with multiple cortical   enhancement defects and minimal perinephric stranding which raises   concern for pyelonephritis. Very mild circumferential thickening of the   urinary bladder walls which could reflect a cystitis.        --- End of Report ---            NUSRAT AGEE DO; Attending Radiologist  This document has been electronically signed. Feb 23 2024  2:07AM    < end of copied text >

## 2024-02-23 NOTE — ED STATDOCS - ENMT NEGATIVE STATEMENT, MLM
[place]  [address], [address]        Víctor Cavazos   [facility] [address]  [facility]3] [id number]           07/15/22     Dear Víctor Cavazos,    We tried to reach you recently regarding your SIMVASTATIN  TAB 40MG , but were unable to reach you on the telephone. We have on file that you are currently taking SIMVASTATIN  TAB 40MG daily. If you are no longer taking or taking differently, please call us at the number below so that we can discuss this and update your medication profile. It appears that this medication has not been filled at proper times. We are worried you might be missing doses or not taking it as directed. It is important that you take your medications regularly and try not to miss a single dose.     Some ways to help you remember to take and refill your medications are to:  · Use a pill box, set an alarm, and/or keep your medication near something that you do every day  · Fill a 3-month supply of your prescription at a time to save you time and trips to the pharmacy - if you would like assistance in switching your prescriptions to a 3-month supply, please contact us  · Ask your pharmacy if they participate in [facility]", a program where you can  all of your medications on the same day  · Ask your pharmacy if you can be set up with automatic refill, where they will automatically refill your prescription when it is due and let you know it's ready to     Sincerely,   [address] [place] [address] free: [phone]
Ears: no ear pain and no hearing problems. Nose: no nasal congestion and no nasal drainage. Mouth/Throat: no dysphagia, no hoarseness and no throat pain. Neck: no lumps, no pain, no stiffness and no swollen glands.

## 2024-02-23 NOTE — H&P ADULT - HISTORY OF PRESENT ILLNESS
18 year old Female with PMH of chronic UTIs, Hx of ESBL UTI, presented with complain of of dysuria, flank pain for the last few days. Was treated outpatient and is currently on antibiotics from her previous ER visit. No chest pain, sob or palpitation.   Patient lives in [place] for work, though she is from [place]. Her UTI episodes started 4 months ago. Since she is having repeated UTI every 2-3 weeks despite multiple antibiotics treatment. Was seen by the Urologist once so far, no procedure was done. Her last Urine culture from [facility] came back positive with ESBL.

## 2024-02-23 NOTE — ED ADULT NURSE NOTE - NSFALLUNIVINTERV_ED_ALL_ED
Bed/Stretcher in lowest position, wheels locked, appropriate side rails in place/Call bell, personal items and telephone in reach/Instruct patient to call for assistance before getting out of bed/chair/stretcher/Non-slip footwear applied when patient is off stretcher/[place] to call system/Physically safe environment - no spills, clutter or unnecessary equipment/Purposeful proactive rounding/Room/bathroom lighting operational, light cord in reach

## 2024-02-23 NOTE — PROGRESS NOTE ADULT - ASSESSMENT
19 yo female with recurrent UTI and hx of ESBL e coli a/w below    #Pyelonephritis  h/o ESBL UTI  -was started on IV meropenem, now stopped due to allergic reaction  -UCx from [place] reviewed by ID-apparently not ESBL  -C/w CTX at this time, follow up ucx here  -Urology and ID recs appreciated  -Dispo planning    #FLU A  Tested positive 2/20  Out of window for Tamiflu    #DVT ppx  SCD/ambulate

## 2024-02-23 NOTE — H&P ADULT - NSHPPHYSICALEXAM_GEN_ALL_CORE
T(C): 36.8 (02-23-24 @ 03:05), Max: 36.9 (02-22-24 @ 21:38)  HR: 72 (02-23-24 @ 03:05) (72 - 81)  BP: 127/81 (02-23-24 @ 03:05) (125/71 - 146/86)  RR: 20 (02-23-24 @ 03:05) (18 - 20)  SpO2: 100% (02-23-24 @ 03:05) (97% - 100%)    CONSTITUTIONAL: Well groomed, no apparent distress  EYES: PERRLA and symmetric, EOMI, No conjunctival or scleral injection, non-icteric  ENMT: Oral mucosa with moist membranes. no pharyngeal injection or exudates             NECK: Supple, symmetric and without tracheal deviation   RESP: No respiratory distress, no use of accessory muscles; CTA b/l, no WRR  CV: RRR, +S1S2, no MRG; no JVD; no peripheral edema  GI: Soft, NT, ND, no rebound, no guarding; no palpable masses; ++Flank tenderness.   LYMPH: No cervical LAD or tenderness;   MSK: Normal ROM without pain, normal muscle strength/tone  SKIN: macular rash in upper front and back, rash in both arms.   NEURO: CN II-XII intact; normal reflexes in upper and lower extremities, sensation intact in upper and lower extremities b/l to light touch   PSYCH: Appropriate insight/judgment; A+O x 3, mood and affect appropriate, recent/remote memory intact

## 2024-02-23 NOTE — ED STATDOCS - CLINICAL SUMMARY MEDICAL DECISION MAKING FREE TEXT BOX
hx of ESBL UTI, ct evidence of pyelonephritis, updated pt and mom with results of labs and imaging. Admission and further inpatient care and follow up with ID.

## 2024-02-23 NOTE — ED ADULT NURSE REASSESSMENT NOTE - NS ED NURSE REASSESS COMMENT FT1
Report received from IVONNE DUFF/ Teresita. Pt is A&Ox3, VSS and documented. Pt states 'I have some pain around where my kidneys are. It's about a 6/10." Pt has no other complaints at this time.  Pt does not appear to be in discomfort or acute distress at this time. Pt updated on plan of care. Family at bedside. Safety and comfort maintained.

## 2024-02-23 NOTE — ED ADULT NURSE REASSESSMENT NOTE - NS ED NURSE REASSESS COMMENT FT1
No rash noted ot chest, abdomen, neck at this time. Pt is breathing unlabored and spontaneously. pt has no complaints at this time. Pt does not appear to be in discomfort or distress at this time. pt updated on plan of care. pt awaiting bed upstairs. safety and comfort maintained.

## 2024-02-23 NOTE — ED STATDOCS - OBJECTIVE STATEMENT
18 year old female with PMH of chronic UTIs, Hx of ESBL UTI, presents with cc of dysuria, flank pain for the last few days. Was treated outpatient and is currently on antibiotics from her previous ER visit. No cp, sob or palpitation. 18 year old female with PMH of chronic UTIs, Hx of ESBL UTI, presents with cc of dysuria, flank pain for the last few days. Was treated outpatient and is currently on antibiotics from her previous ER visit. No cp, sob or palpitation. No abdominal pain. No recent trauma. Ambulatory. Tolerating PO.

## 2024-02-23 NOTE — ED ADULT NURSE REASSESSMENT NOTE - NS ED NURSE REASSESS COMMENT FT1
Lizella through administration for antibiotics, pt developed rash to back, chest, throat, and abdomen. Pt denies itchiness and difficulty breathing. Pt does not appear in respiratory distress. Antiobiotic administration stopped immediately. MD Christensen aware and bedside. VSS and documented. Patient placed on cardiac monitor. safety and comfort maintained.

## 2024-02-24 ENCOUNTER — TRANSCRIPTION ENCOUNTER (OUTPATIENT)
Age: 19
End: 2024-02-24

## 2024-02-24 VITALS
RESPIRATION RATE: 18 BRPM | TEMPERATURE: 98 F | OXYGEN SATURATION: 98 % | SYSTOLIC BLOOD PRESSURE: 123 MMHG | HEART RATE: 66 BPM | DIASTOLIC BLOOD PRESSURE: 69 MMHG

## 2024-02-24 LAB
CULTURE RESULTS: SIGNIFICANT CHANGE UP
SPECIMEN SOURCE: SIGNIFICANT CHANGE UP

## 2024-02-24 PROCEDURE — 99239 HOSP IP/OBS DSCHRG MGMT >30: CPT

## 2024-02-24 RX ORDER — POLYETHYLENE GLYCOL 3350 17 G/17G
17 POWDER, FOR SOLUTION ORAL ONCE
Refills: 0 | Status: COMPLETED | OUTPATIENT
Start: 2024-02-24 | End: 2024-02-24

## 2024-02-24 RX ORDER — POLYETHYLENE GLYCOL 3350 17 G/17G
17 POWDER, FOR SOLUTION ORAL
Qty: 0 | Refills: 0 | DISCHARGE
Start: 2024-02-24

## 2024-02-24 RX ORDER — ACETAMINOPHEN 500 MG
2 TABLET ORAL
Qty: 0 | Refills: 0 | DISCHARGE
Start: 2024-02-24

## 2024-02-24 RX ORDER — CIPROFLOXACIN LACTATE 400MG/40ML
1 VIAL (ML) INTRAVENOUS
Qty: 24 | Refills: 0
Start: 2024-02-24 | End: 2024-03-06

## 2024-02-24 RX ADMIN — ONDANSETRON 4 MILLIGRAM(S): 8 TABLET, FILM COATED ORAL at 09:39

## 2024-02-24 RX ADMIN — Medication 650 MILLIGRAM(S): at 14:09

## 2024-02-24 RX ADMIN — POLYETHYLENE GLYCOL 3350 17 GRAM(S): 17 POWDER, FOR SOLUTION ORAL at 14:09

## 2024-02-24 RX ADMIN — CEFTRIAXONE 1000 MILLIGRAM(S): 500 INJECTION, POWDER, FOR SOLUTION INTRAMUSCULAR; INTRAVENOUS at 10:31

## 2024-02-24 NOTE — DISCHARGE NOTE NURSING/CASE MANAGEMENT/SOCIAL WORK - PATIENT PORTAL LINK FT
You can access the FollowMyHealth Patient Portal offered by [facility] by registering at the following website: [email or web address]. By joining [employer]’s FollowMyHealth portal, you will also be able to view your health information using other applications (apps) compatible with our system.

## 2024-02-24 NOTE — DISCHARGE NOTE PROVIDER - PROVIDER TOKENS
PROVIDER:[TOKEN:[[id number]],FOLLOWUP:[2 weeks]],PROVIDER:[TOKEN:[[id number]],FOLLOWUP:[1 week]]

## 2024-02-24 NOTE — DISCHARGE NOTE PROVIDER - NSDCCPCAREPLAN_GEN_ALL_CORE_FT
PRINCIPAL DISCHARGE DIAGNOSIS  Diagnosis: Pyelonephritis  Assessment and Plan of Treatment: Complete oral abxs   take tylenol for pain as needed    as per urology recommendations: good oral hydration, timed voiding, urinate right after sex, change sanitary pads often, avoid douches, bubble baths and other feminine hygiene products.   F/u withurology         SECONDARY DISCHARGE DIAGNOSES  Diagnosis: Constipation  Assessment and Plan of Treatment: Oral hydration   Take miralax daily   if no regular Bowel movements may add dulcolax OTC as needed   F/u with PCP

## 2024-02-24 NOTE — DISCHARGE NOTE PROVIDER - CARE PROVIDER_API CALL
Duong Hoover  Urology  [address] [facility]  [place] [address]  Phone: [phone]  Fax: [phone]  Follow Up Time: 2 weeks    Louis Hawk  Pediatrics  [address] [place] [id number]  Phone: [phone]  Fax: [phone]  Follow Up Time: 1 week

## 2024-02-24 NOTE — DISCHARGE NOTE PROVIDER - NSDCMRMEDTOKEN_GEN_ALL_CORE_FT
acetaminophen 325 mg oral tablet: 2 tab(s) orally every 6 hours As needed Mild Pain (1 - 3)  ciprofloxacin 500 mg oral tablet: 1 tab(s) orally 2 times a day  Junel Fe 1/20 oral tablet: 1 tab(s) orally once a day , Pt on Treatment weeks.  Mom can bring in supply from home if needed  polyethylene glycol 3350 oral powder for reconstitution: 17 gram(s) orally once a day  prochlorperazine 5 mg oral tablet: 1 tab(s) orally every 6 hours as needed for  nausea

## 2024-02-24 NOTE — DISCHARGE NOTE NURSING/CASE MANAGEMENT/SOCIAL WORK - NSDCPEFALRISK_GEN_ALL_CORE
For information on Fall & Injury Prevention, visit: [email or web address] OR  [email or web address] OR  https://www.cdc.gov/steadi/patient.html

## 2024-02-24 NOTE — DISCHARGE NOTE PROVIDER - HOSPITAL COURSE
18 year old Female with PMH of recurrent UTIs presented with complains of dysuria, flank pain and fevers for the last few days. Py was seen in  [place] and was Tx with oral cipro with no improvement of symptoms, she came back to [place] and presented in ED. Was reported initially that UCX had ESBL, but as per ID reviewed, ECOLI sensitive to Ceftriaxone and Cipro. Pt also reported that since last summer had about 6 UTIs and usually has same symptoms cloudy and foul smelling urine. Pt was  admitted and started on meropenem initially but developed rash. Was continued on ceftriaxone.  CT abd/pelvis + for pyelonephritis and cystitis, no hydro, no nephrolithiasis.  UA was neg o admission, UCX <10K, BCX NGTD. Pt was followed by ID and recs total 14 days course for Acute Pyelonephritis Tx, will switch to PO Cipro. Pt also was evaluated by urology, Pt may f/u outPt   Pt also found to be + for Influenza and reported that did have upper respiratory symptoms earlier this week, now resolved. No cough, no SOB. CXR neg.   Today Pt was seen and examined, reports feels better, still has mild flank pain and nausea this am, tolerates diet, no Vomiting. Pt also reports constipation. Results, meds, outPt f/u  and dc planning d/w Pt and parents and bedside, agree with dc   Vital Signs Last 24 Hrs  T(C): 36.8 (24 Feb 2024 08:44), Max: 98.2 (23 Feb 2024 15:04)  T(F): 98.2 (24 Feb 2024 08:44), Max: 208.7 (23 Feb 2024 15:04)  HR: 66 (24 Feb 2024 08:44) (64 - 74)  BP: 123/69 (24 Feb 2024 08:44) (115/64 - 123/69)  BP(mean): 78 (23 Feb 2024 15:04) (78 - 78)  RR: 18 (24 Feb 2024 08:44) (16 - 18)  SpO2: 98% (24 Feb 2024 08:44) (95% - 99%)    Parameters below as of 24 Feb 2024 08:44  Patient On (Oxygen Delivery Method): room air    PHYSICAL EXAM:  General: in no acute distress  Eyes: EOMI; conjunctiva and sclera clear  Head: Normocephalic; atraumatic  ENMT: No nasal discharge; airway clear  Neck: Supple; non tender; no masses  Respiratory: No wheezes, rales or rhonchi  Cardiovascular: Regular rate and rhythm. S1 and S2 Normal;  Gastrointestinal: Soft non-tender non-distended; Normal bowel sounds  Genitourinary: No  suprapubic  tenderness  Extremities: Normal range of motion, No edema  Neurological: Alert and oriented x3, non focal   Musculoskeletal: Normal muscle tone, without deformities  Psychiatric: Cooperative and appropriate    A/P: 18 year old Female with PMH of recurrent UTIs admitted for:     # ECOLI Pyelonephritis  -was started on IV meropenem, now stopped due to allergic reaction, had rash   -UCx from [place] reviewed by ID: not ESBL  - BCX: NGTD, UCX: <10K   - On IV ceftriaxone, change to Cipro x 12 more days   -F/u with PCP   - F/u with urology       #FLU A  Tested positive 2/20  Out of window for Tamiflu      #DVT ppx  SCD/ambulate    Dispo; stable for dc home   fax dc summary to PCP  Total time 43 min

## 2024-02-28 LAB
CULTURE RESULTS: SIGNIFICANT CHANGE UP
SPECIMEN SOURCE: SIGNIFICANT CHANGE UP

## 2024-02-29 DIAGNOSIS — N10 ACUTE PYELONEPHRITIS: ICD-10-CM

## 2024-02-29 DIAGNOSIS — B96.20 UNSPECIFIED ESCHERICHIA COLI [E. COLI] AS THE CAUSE OF DISEASES CLASSIFIED ELSEWHERE: ICD-10-CM

## 2024-02-29 DIAGNOSIS — Z11.52 ENCOUNTER FOR SCREENING FOR COVID-19: ICD-10-CM

## 2024-02-29 DIAGNOSIS — J10.1 INFLUENZA DUE TO OTHER IDENTIFIED INFLUENZA VIRUS WITH OTHER RESPIRATORY MANIFESTATIONS: ICD-10-CM

## 2024-02-29 DIAGNOSIS — Z88.1 ALLERGY STATUS TO OTHER ANTIBIOTIC AGENTS STATUS: ICD-10-CM

## 2024-02-29 DIAGNOSIS — N30.90 CYSTITIS, UNSPECIFIED WITHOUT HEMATURIA: ICD-10-CM

## 2024-03-08 NOTE — H&P ADULT - NSHPPOAURINARYCATHETER_GEN_ALL_CORE
1520-Pt tolerated MVASI, C1D1 Keytruda infusions  and IVF's well, no complications or side effects, POC and meds discussed with pt, pt aware of upcoming appts, pt knows to call MD with any questions or concerns. Pt off unit via w/c, no distress noted.   no

## 2024-03-22 PROBLEM — Z78.9 OTHER SPECIFIED HEALTH STATUS: Chronic | Status: ACTIVE | Noted: 2024-02-23

## 2024-03-28 ENCOUNTER — APPOINTMENT (OUTPATIENT)
Dept: UROLOGY | Facility: CLINIC | Age: 19
End: 2024-03-28
Payer: MEDICAID

## 2024-03-28 VITALS
OXYGEN SATURATION: 99 % | DIASTOLIC BLOOD PRESSURE: 83 MMHG | HEART RATE: 76 BPM | HEIGHT: 63 IN | SYSTOLIC BLOOD PRESSURE: 135 MMHG | BODY MASS INDEX: 24.8 KG/M2 | WEIGHT: 140 LBS

## 2024-03-28 DIAGNOSIS — R39.89 OTHER SYMPTOMS AND SIGNS INVOLVING THE GENITOURINARY SYSTEM: ICD-10-CM

## 2024-03-28 DIAGNOSIS — Z87.440 PERSONAL HISTORY OF URINARY (TRACT) INFECTIONS: ICD-10-CM

## 2024-03-28 DIAGNOSIS — N39.3 STRESS INCONTINENCE (FEMALE) (MALE): ICD-10-CM

## 2024-03-28 DIAGNOSIS — N39.0 URINARY TRACT INFECTION, SITE NOT SPECIFIED: ICD-10-CM

## 2024-03-28 PROCEDURE — 99204 OFFICE O/P NEW MOD 45 MIN: CPT

## 2024-03-28 RX ORDER — NITROFURANTOIN MACROCRYSTALS 100 MG/1
100 CAPSULE ORAL ONCE
Qty: 30 | Refills: 1 | Status: ACTIVE | COMMUNITY
Start: 2024-03-28 | End: 1900-01-01

## 2024-03-28 NOTE — REVIEW OF SYSTEMS
[Negative] : Heme/Lymph [Constipation] : constipation [Urine Infection (bladder/kidney)] : bladder/kidney infection [Bladder pressure] : experiences bladder pressure [Told you have blood in urine on a urine test] : told blood was present in a urine test [Leakage of urine with straining, coughing, laughing] : leakage of urine with straining, coughing, laughing [see HPI] : see HPI [Anxiety] : anxiety [Depression] : depression

## 2024-03-28 NOTE — ASSESSMENT
[FreeTextEntry1] : 18-year-old female with frequent UTIs, bladder pressure, and mild stress incontinence.   Will send UA, UC, and Cytology.  Will obtain RBUS. Will start nitrofurantoin 1 hour before sexual activity.  Can continue D-Mannose supplements.   Will obtain CMG study for stress incontinence.   Will RTO for RBUS, CMG, and Cystoscopy.

## 2024-03-28 NOTE — HISTORY OF PRESENT ILLNESS
[FreeTextEntry1] : 18-year-old female presents with complaints of frequent UTIs. Has had 6 infections within the past year. The most recent infection elaine her to [facility] were CT (2/23/24) showed heterogeneous enhancement of both kidneys with multiple cortical enhancement defects and minimal perinephric stranding which raises concern for pyelonephritis. Very mild circumferential thickening of the urinary bladder walls which could reflect a cystitis. Reports UTIs are associated with sexual activity. States she still has some lower back tenderness and bladder pressure. Endorses urine leaking with sneezing. Takes D-Mannose supplements daily.

## 2024-03-28 NOTE — PHYSICAL EXAM
[Normal Appearance] : normal appearance [Well Groomed] : well groomed [General Appearance - In No Acute Distress] : no acute distress [Edema] : no peripheral edema [Respiration, Rhythm And Depth] : normal respiratory rhythm and effort [Exaggerated Use Of Accessory Muscles For Inspiration] : no accessory muscle use [Abdomen Soft] : soft [Costovertebral Angle Tenderness] : no ~M costovertebral angle tenderness [Abdomen Tenderness] : non-tender [Urinary Bladder Findings] : the bladder was normal on palpation [Normal Station and Gait] : the gait and station were normal for the patient's age [] : no rash [No Focal Deficits] : no focal deficits [Oriented To Time, Place, And Person] : oriented to person, place, and time [Mood] : the mood was normal [Affect] : the affect was normal [No Palpable Adenopathy] : no palpable adenopathy

## 2024-03-29 LAB
APPEARANCE: CLEAR
BACTERIA: ABNORMAL /HPF
BILIRUBIN URINE: NEGATIVE
BLOOD URINE: NEGATIVE
CALCIUM OXALATE CRYSTALS: PRESENT
CAST: 0 /LPF
COLOR: YELLOW
EPITHELIAL CELLS: 6 /HPF
GLUCOSE QUALITATIVE U: NEGATIVE MG/DL
KETONES URINE: NEGATIVE MG/DL
LEUKOCYTE ESTERASE URINE: ABNORMAL
MICROSCOPIC-UA: NORMAL
NITRITE URINE: NEGATIVE
PH URINE: 7
PROTEIN URINE: NEGATIVE MG/DL
RED BLOOD CELLS URINE: NORMAL /HPF
REVIEW: NORMAL
SPECIFIC GRAVITY URINE: 1.01
URINE CYTOLOGY: NORMAL
UROBILINOGEN URINE: 0.2 MG/DL
WHITE BLOOD CELLS URINE: 4 /HPF

## 2024-03-31 ENCOUNTER — NON-APPOINTMENT (OUTPATIENT)
Age: 19
End: 2024-03-31

## 2024-04-01 LAB — BACTERIA UR CULT: NORMAL

## 2024-04-26 ENCOUNTER — APPOINTMENT (OUTPATIENT)
Dept: UROLOGY | Facility: CLINIC | Age: 19
End: 2024-04-26

## 2024-04-29 ENCOUNTER — APPOINTMENT (OUTPATIENT)
Dept: UROLOGY | Facility: CLINIC | Age: 19
End: 2024-04-29
Payer: MEDICAID

## 2024-04-29 DIAGNOSIS — N39.0 URINARY TRACT INFECTION, SITE NOT SPECIFIED: ICD-10-CM

## 2024-04-29 PROCEDURE — 76857 US EXAM PELVIC LIMITED: CPT

## 2024-04-29 PROCEDURE — 76705 ECHO EXAM OF ABDOMEN: CPT

## 2024-05-01 ENCOUNTER — EMERGENCY (EMERGENCY)
Facility: HOSPITAL | Age: 19
LOS: 0 days | Discharge: HOME | End: 2024-05-01
Attending: STUDENT IN AN ORGANIZED HEALTH CARE EDUCATION/TRAINING PROGRAM
Payer: MEDICAID

## 2024-05-01 VITALS
HEART RATE: 74 BPM | OXYGEN SATURATION: 100 % | SYSTOLIC BLOOD PRESSURE: 109 MMHG | DIASTOLIC BLOOD PRESSURE: 64 MMHG | TEMPERATURE: 98 F | HEIGHT: 65 IN | RESPIRATION RATE: 18 BRPM

## 2024-05-01 VITALS
OXYGEN SATURATION: 98 % | RESPIRATION RATE: 18 BRPM | HEART RATE: 67 BPM | SYSTOLIC BLOOD PRESSURE: 117 MMHG | DIASTOLIC BLOOD PRESSURE: 75 MMHG | TEMPERATURE: 98 F

## 2024-05-01 DIAGNOSIS — Z88.8 ALLERGY STATUS TO OTHER DRUGS, MEDICAMENTS AND BIOLOGICAL SUBSTANCES: ICD-10-CM

## 2024-05-01 DIAGNOSIS — R10.9 UNSPECIFIED ABDOMINAL PAIN: ICD-10-CM

## 2024-05-01 DIAGNOSIS — M54.50 LOW BACK PAIN, UNSPECIFIED: ICD-10-CM

## 2024-05-01 LAB
APPEARANCE UR: CLEAR — SIGNIFICANT CHANGE UP
BACTERIA # UR AUTO: ABNORMAL /HPF
BILIRUB UR-MCNC: NEGATIVE — SIGNIFICANT CHANGE UP
CAST: 0 /LPF — SIGNIFICANT CHANGE UP (ref 0–4)
COLOR SPEC: YELLOW — SIGNIFICANT CHANGE UP
DIFF PNL FLD: NEGATIVE — SIGNIFICANT CHANGE UP
GLUCOSE UR QL: NEGATIVE MG/DL — SIGNIFICANT CHANGE UP
KETONES UR-MCNC: NEGATIVE MG/DL — SIGNIFICANT CHANGE UP
LEUKOCYTE ESTERASE UR-ACNC: ABNORMAL
NITRITE UR-MCNC: NEGATIVE — SIGNIFICANT CHANGE UP
PH UR: 6 — SIGNIFICANT CHANGE UP (ref 5–8)
POCT URINE PREGNANCY TEST: NEGATIVE — SIGNIFICANT CHANGE UP
PROT UR-MCNC: NEGATIVE MG/DL — SIGNIFICANT CHANGE UP
RBC CASTS # UR COMP ASSIST: 1 /HPF — SIGNIFICANT CHANGE UP (ref 0–4)
SP GR SPEC: 1.03 — SIGNIFICANT CHANGE UP (ref 1–1.03)
SQUAMOUS # UR AUTO: 3 /HPF — SIGNIFICANT CHANGE UP (ref 0–5)
UROBILINOGEN FLD QL: 0.2 MG/DL — SIGNIFICANT CHANGE UP (ref 0.2–1)
WBC UR QL: 3 /HPF — SIGNIFICANT CHANGE UP (ref 0–5)

## 2024-05-01 PROCEDURE — 81025 URINE PREGNANCY TEST: CPT

## 2024-05-01 PROCEDURE — 81001 URINALYSIS AUTO W/SCOPE: CPT

## 2024-05-01 PROCEDURE — 99284 EMERGENCY DEPT VISIT MOD MDM: CPT

## 2024-05-01 PROCEDURE — 87086 URINE CULTURE/COLONY COUNT: CPT

## 2024-05-01 PROCEDURE — 99283 EMERGENCY DEPT VISIT LOW MDM: CPT

## 2024-05-01 RX ORDER — ACETAMINOPHEN 500 MG
650 TABLET ORAL ONCE
Refills: 0 | Status: COMPLETED | OUTPATIENT
Start: 2024-05-01 | End: 2024-05-01

## 2024-05-01 RX ORDER — IBUPROFEN 200 MG
600 TABLET ORAL ONCE
Refills: 0 | Status: COMPLETED | OUTPATIENT
Start: 2024-05-01 | End: 2024-05-01

## 2024-05-01 RX ORDER — LIDOCAINE 4 G/100G
1 CREAM TOPICAL ONCE
Refills: 0 | Status: COMPLETED | OUTPATIENT
Start: 2024-05-01 | End: 2024-05-01

## 2024-05-01 RX ADMIN — Medication 650 MILLIGRAM(S): at 21:22

## 2024-05-01 RX ADMIN — Medication 600 MILLIGRAM(S): at 21:22

## 2024-05-01 RX ADMIN — LIDOCAINE 1 PATCH: 4 CREAM TOPICAL at 21:22

## 2024-05-01 NOTE — ED ADULT NURSE NOTE - NS ED NURSE LEVEL OF CONSCIOUSNESS ORIENTATION
Patient ID: Micky Varghese is a 69 y o  female  Assessment/Plan:    No problem-specific Assessment & Plan notes found for this encounter  {Assess/PlanSmartLinks:77784}       Subjective:    HPI    {[facility] HPI texts:88526}    {Common ambulatory SmartLinks:61653}         Objective:    Blood pressure 142/82, pulse 78, height 5' 9" (1 753 m), weight 81 2 kg (179 lb)  Physical Exam    Neurological Exam      ROS:    Review of Systems   Constitutional: Positive for activity change (join gym) and fatigue  Neurological: Positive for dizziness and light-headedness  Psychiatric/Behavioral: Positive for sleep disturbance (not sleeping at all)  Oriented - self; Oriented - place; Oriented - time

## 2024-05-01 NOTE — ED ADULT NURSE NOTE - OBJECTIVE STATEMENT
Pt ambulatory to ED with c/o low back pain. pt states 2 months ago she was in the hospital for kidney infections. Pt denies N/V denies urinary symptoms. Pt is AAO x4. denies any other complaints, in no obvious distress, ambulatory.

## 2024-05-01 NOTE — ED STATDOCS - NSFOLLOWUPINSTRUCTIONS_ED_ALL_ED_FT
Please go to [facility] to find a female urologist or continue to follow-up with Dr. Kaminski. Your urine test does not show a gross Urine infection. If your urine culture comes back positive, we will contact you to start antibiotics. Please go to [facility] to find a female urologist or continue to follow-up with Dr. Casey, contact tomorrow morning to be seen asap. Return to the Emergency Department for worsening or persistent symptoms, and/or ANY NEW OR CONCERNING SYMPTOMS. If you have issues obtaining follow up, please call: [phone] ([phone] or [phone]  to obtain a doctor or specialist who takes your insurance in your area.    Ultrasound: DILEEP PRADO is here today for a kidney ultrasound for UTI.     Addtional Procedure Findings and Comments: The kidneys were evaluated in both axial and transverse planes, utilizing the BK 3000 ultrasound and the 4.0 mHz solid state transducer. Views were taken of the upper pole, middle section of the renal pelvis and the lower pole from medial to lateral and the echogenicity compared to surrounding structures.     RENAL     Right Kidney: 10.3 x 5.8 x 5.0 cm   Cortical Thickness: UP 1.2 cm, LP 1.2 cm    Left kidney: 10.7 x 5.1 x 6.2 cm   Cortical Thickness: UP 1.5 cm, LP 1.5 cm    Echogenicity: Normal     Bladder: Bilateral jets are visualized with a pre void of 161 cc and 8 cc PVR.    Findings: Both kidneys are normal in size and echogenicity without hydronephrosis, stones or solid masses visualized.     ARMAND Hamm RDMS 04/29/2024.

## 2024-05-01 NOTE — ED STATDOCS - PHYSICAL EXAMINATION
Constitutional: well appearing, NAD AAOx3  Eyes: EOMI, PERRL  Head: Normocephalic atraumatic  Mouth: no airway obstruction, posterior oropharynx clear without erythema or exudate  Neck: supple  Cardiac: regular rate and rhythm, no MRG  Resp: Lungs CTAB  GI: Abd s/nt/nd  : no CVAT  Neuro: CN2-12 intact, strength 5/5x4, sensation grossly intact  Msk: no midline c/t/l spine ttp  Skin: No rashes

## 2024-05-01 NOTE — ED ADULT TRIAGE NOTE - CHIEF COMPLAINT QUOTE
Pt ambulatory to ED with c/o low back pain. Pt states she was hospitalized 2 months ago with dual kidney infection. Pt denies N/V. -PMH -meds PTA

## 2024-05-01 NOTE — ED STATDOCS - PATIENT PORTAL LINK FT
You can access the FollowMyHealth Patient Portal offered by [facility] by registering at the following website: [email or web address]. By joining [email or web address]’s FollowMyHealth portal, you will also be able to view your health information using other applications (apps) compatible with our system.

## 2024-05-01 NOTE — ED STATDOCS - CLINICAL SUMMARY MEDICAL DECISION MAKING FREE TEXT BOX
pain control, UA monitor, reassess Pt is afebrile and well appearing, will proceed with pain control, UA monitor, reassess.   On reassessment pt's back pain significantly improved, UA shows no gross UTI, UCX sent. Advised to f/u with pcp and urologist in AM, given strict return precautions and discharged in stable condition

## 2024-05-02 ENCOUNTER — APPOINTMENT (OUTPATIENT)
Dept: UROLOGY | Facility: CLINIC | Age: 19
End: 2024-05-02

## 2024-05-03 ENCOUNTER — TRANSCRIPTION ENCOUNTER (OUTPATIENT)
Age: 19
End: 2024-05-03

## 2024-05-03 LAB
CULTURE RESULTS: SIGNIFICANT CHANGE UP
SPECIMEN SOURCE: SIGNIFICANT CHANGE UP

## 2024-06-24 ENCOUNTER — APPOINTMENT (OUTPATIENT)
Dept: UROLOGY | Facility: CLINIC | Age: 19
End: 2024-06-24